# Patient Record
(demographics unavailable — no encounter records)

---

## 2025-01-29 NOTE — PHYSICAL EXAM

## 2025-01-29 NOTE — REASON FOR VISIT
[Hypertension] : hypertension
Patient with one or more new problems requiring additional work-up/treatment.

## 2025-01-29 NOTE — DISCUSSION/SUMMARY
[FreeTextEntry1] : 1.  Hypertension: Reviewed patient's blood pressure monitor which correlates well with manual pressure reading.  Historically blood pressure between 115 and 125/70-80.  At this time we will continue current regimen of medication.  Will refer her for coronary calcium score. 2.  Cardiac murmur: Echocardiogram  Patient up-to-date with screening colonoscopy mammogram and Pap smear. [EKG obtained to assist in diagnosis and management of assessed problem(s)] : EKG obtained to assist in diagnosis and management of assessed problem(s)

## 2025-01-29 NOTE — HISTORY OF PRESENT ILLNESS
[FreeTextEntry1] : 51-year-old female with a past medical history of hypertension diabetes comes in for follow-up.  Overall feels well denies chest pain shortness of breath PND orthopnea.  Has lost a considerable amount of weight electively with the use of Mounjaro.

## 2025-07-23 NOTE — DISCUSSION/SUMMARY
[FreeTextEntry1] : 1.  Hypertension: Well-controlled on current medication advised to continue maintain a low-salt diet.  EKG reviewed sinus rhythm within normal limits 2.  Hyperlipidemia: Patient to continue atorvastatin and follow-up with PCP for routine medical matters and blood work. reviewed recent bw done by pcp ldl 82, advised diet and continued weight loss [EKG obtained to assist in diagnosis and management of assessed problem(s)] : EKG obtained to assist in diagnosis and management of assessed problem(s)

## 2025-07-23 NOTE — PHYSICAL EXAM

## 2025-07-23 NOTE — HISTORY OF PRESENT ILLNESS
[FreeTextEntry1] : 51-year-old female with a past medical history of hyperlipidemia borderline diabetic hypertension comes in for follow-up.  Overall, she feels well denies any chest pain shortness of breath PND orthopnea